# Patient Record
Sex: MALE | Race: WHITE | Employment: OTHER | URBAN - METROPOLITAN AREA
[De-identification: names, ages, dates, MRNs, and addresses within clinical notes are randomized per-mention and may not be internally consistent; named-entity substitution may affect disease eponyms.]

---

## 2020-12-24 ENCOUNTER — HOSPITAL ENCOUNTER (EMERGENCY)
Facility: CLINIC | Age: 65
Discharge: HOME | End: 2020-12-24
Attending: EMERGENCY MEDICINE | Admitting: EMERGENCY MEDICINE
Payer: COMMERCIAL

## 2020-12-24 VITALS
DIASTOLIC BLOOD PRESSURE: 84 MMHG | BODY MASS INDEX: 24.44 KG/M2 | TEMPERATURE: 97.6 F | RESPIRATION RATE: 16 BRPM | HEIGHT: 69 IN | SYSTOLIC BLOOD PRESSURE: 157 MMHG | WEIGHT: 165 LBS | HEART RATE: 55 BPM | OXYGEN SATURATION: 97 %

## 2020-12-24 DIAGNOSIS — T15.92XA FOREIGN BODY IN EYE, LEFT, INITIAL ENCOUNTER: ICD-10-CM

## 2020-12-24 PROCEDURE — 90715 TDAP VACCINE 7 YRS/> IM: CPT | Performed by: EMERGENCY MEDICINE

## 2020-12-24 PROCEDURE — 99283 EMERGENCY DEPT VISIT LOW MDM: CPT | Mod: 25

## 2020-12-24 PROCEDURE — 90471 IMMUNIZATION ADMIN: CPT | Performed by: EMERGENCY MEDICINE

## 2020-12-24 PROCEDURE — 250N000011 HC RX IP 250 OP 636: Performed by: EMERGENCY MEDICINE

## 2020-12-24 RX ORDER — TETRACAINE HYDROCHLORIDE 5 MG/ML
SOLUTION OPHTHALMIC
Status: DISCONTINUED
Start: 2020-12-24 | End: 2020-12-24 | Stop reason: HOSPADM

## 2020-12-24 RX ORDER — POLYMYXIN B SULFATE AND TRIMETHOPRIM 1; 10000 MG/ML; [USP'U]/ML
1-2 SOLUTION OPHTHALMIC EVERY 4 HOURS
Qty: 5 ML | Refills: 0 | Status: SHIPPED | OUTPATIENT
Start: 2020-12-24 | End: 2020-12-31

## 2020-12-24 RX ADMIN — CLOSTRIDIUM TETANI TOXOID ANTIGEN (FORMALDEHYDE INACTIVATED), CORYNEBACTERIUM DIPHTHERIAE TOXOID ANTIGEN (FORMALDEHYDE INACTIVATED), BORDETELLA PERTUSSIS TOXOID ANTIGEN (GLUTARALDEHYDE INACTIVATED), BORDETELLA PERTUSSIS FILAMENTOUS HEMAGGLUTININ ANTIGEN (FORMALDEHYDE INACTIVATED), BORDETELLA PERTUSSIS PERTACTIN ANTIGEN, AND BORDETELLA PERTUSSIS FIMBRIAE 2/3 ANTIGEN 0.5 ML: 5; 2; 2.5; 5; 3; 5 INJECTION, SUSPENSION INTRAMUSCULAR at 17:57

## 2020-12-24 ASSESSMENT — ENCOUNTER SYMPTOMS
EYE REDNESS: 1
EYE PAIN: 1
HEADACHES: 0

## 2020-12-24 ASSESSMENT — MIFFLIN-ST. JEOR: SCORE: 1518.82

## 2020-12-24 NOTE — ED AVS SNAPSHOT
[facility] Emergency Dept  [address]  [facility] [address]  Phone: [phone]  Fax: [phone]                                    Jian Griffith   MRN: [id number]    Department: [facility] Emergency Dept   Date of Visit: 12/24/2020           After Visit Summary Signature Page    I have received my discharge instructions, and my questions have been answered. I have discussed any challenges I see with this plan with the nurse or doctor.    ..........................................................................................................................................  Patient/Patient Representative Signature      ..........................................................................................................................................  Patient Representative Print Name and Relationship to Patient    ..................................................               ................................................  Date                                   Time    ..........................................................................................................................................  Reviewed by Signature/Title    ...................................................              ..............................................  Date                                               Time          22EPIC Rev 08/18

## 2020-12-24 NOTE — ED TRIAGE NOTES
Grinding metal and metal fragment got into left eye. Able to see. Tearing and redness to left eye. Sent in from [facility]

## 2020-12-24 NOTE — ED PROVIDER NOTES
"History     Chief Complaint:  Eye Pain       HPI  Jian Griffith is a 65 year old male with a history of COPD, Hypertension, and bilateral Hyperopia who presents for evaluation of left eye pain.  The patient states that he was grinding metal a couple days ago when his eye pain began.  At first, he thought there was a sty developing, as he saw a yellow dot in the corner of his eye.  His pain has increased over the past couple of days and he now feels as though there is something in his eye.  He has had foreign objects in his eye before and states it feels similar.  He has pain in the eye when he moves it.  He denies any headaches and changes in vision.    He was seen at  prior to presenting today where they saw a foreign body, but could not remove it.    Allergies:  No Known Allergies    Medications:   Flonase  Norvasc  Tenormin  Lipitor  Zestril  Revatio  Incruse Ellipta  Ventolin  Ecotrin  Plavix  Xarelto      Medical History:   Right popliteal occlusion  Hyperopia both eyes  Systolic murmur  ED  Adenomatous colon polyp  Elevated blood sugar  Macrocytosis  COPD  Hypertension  Hyperlipidemia  Emphysema    Surgical History   colonoscopy    Family History:   Father: Stroke  Mother: breast cancer    Social History:  Patient presents to ED alone.  Patient likes to work on cars.  PCP: No primary care provider on file.    Review of Systems   Eyes: Positive for pain and redness. Negative for visual disturbance.   Neurological: Negative for headaches.   All other systems reviewed and are negative.    Physical Exam     Patient Vitals for the past 24 hrs:   BP Temp Pulse Resp SpO2 Height Weight   12/24/20 1634 (!) 157/84 97.6  F (36.4  C) 55 16 97 % 1.753 m (5' 9\") 74.8 kg (165 lb)        Physical Exam   General: Patient is alert, awake and interactive when I enter the room  Head: The scalp, face, and head appear normal  Eyes:   Visual acuity (R): 20/25, (L): 20/25  Lids WNL  No foreign body noted in detailed exam upper " and lower lids/margins  PERRLA, EOMI.  Anterior Chamber: No cells or flare, No hyphema. No hypopyon.   Cornea: positive for metallic FB Fluorescein staining: positive as seen below.  Image from ED visit today included below.      ENT: The nose is normal, Pinnae are normal, External acoustic canals are normal  Neck: Trachea midline  CV: Pulses are normal.   Resp: No respiratory distress   Musc: Normal muscular tone, moving all extremities.  Skin: No rash or lesions noted  Neuro: Speech is normal and fluent. Face is symmetric.   Psych: Normal affect.  Appropriate interactions        Emergency Department Course     Procedures    Procedure: metal foreign body removal  Indication: FB in the left eye   Anesthesia: Tetracaine 0.5% 3 droops   Procedure: A 18 gauge needle was utilized to lift the foreign body off the cornea on the first attempt. The patient tolerated the procedure well with no complications.    Emergency Department Course:     Reviewed:  I reviewed the patient's nursing notes, vitals, past medical records, Care Everywhere.      Assessments:  1708 I preformed my initial assessment of patient.     1712 I performed the metal foreign body removal procedure as documented above.     Interventions:  1757 Tdap 0.5 mL intramuscular injection      Disposition:  Discharged to home.       Impression & Plan     Medical Decision Making:  Patient is a 65-year-old gentleman who does not wear contacts but does wear eyeglasses who presents to the emergency department with foreign body sensation in the left eye.  Patient was grinding metal a few days ago and then over the past 24 hours has developed pain, redness and foreign body sensation in the left eye.  He was seen at [facility] and sent here for further evaluation and treatment.  On my exam, the patient has a small metallic foreign body in his left eye.  This was removed as above.  No evidence of significant infection at this time.  However will cover  with eyedrops and have him follow-up closely with ophthalmology for further evaluation.    Diagnosis:     ICD-10-CM    1. Foreign body in eye, left, initial encounter  T15.92XA         Disposition:  Discharged to home.    Discharge Medications:  New Prescriptions    TRIMETHOPRIM-POLYMYXIN B (POLYTRIM) 80225-5.1 UNIT/ML-% OPHTHALMIC SOLUTION    Place 1-2 drops Into the left eye every 4 hours for 7 days       Scribe Disclosure:  I, Tara Alvarez, am serving as a scribe at 5:09 PM on 12/24/2020 to document services personally performed by Robin Frederick MD based on my observations and the provider's statements to me.     [place] EILEEN Frederick, Robin Damon MD  12/24/20 3422

## 2020-12-24 NOTE — DISCHARGE INSTRUCTIONS
Please follow-up with eye doctor in the next 24 to 48 hours for repeat eye exam.  Please return to the emergency department with any difficulty with vision, changes in vision or significant pain.